# Patient Record
Sex: MALE | Race: AMERICAN INDIAN OR ALASKA NATIVE | URBAN - METROPOLITAN AREA
[De-identification: names, ages, dates, MRNs, and addresses within clinical notes are randomized per-mention and may not be internally consistent; named-entity substitution may affect disease eponyms.]

---

## 2020-01-27 ENCOUNTER — INPATIENT (INPATIENT)
Facility: HOSPITAL | Age: 75
LOS: 1 days | Discharge: HOME | End: 2020-01-29
Attending: INTERNAL MEDICINE | Admitting: INTERNAL MEDICINE
Payer: MEDICARE

## 2020-01-27 VITALS
SYSTOLIC BLOOD PRESSURE: 150 MMHG | OXYGEN SATURATION: 99 % | WEIGHT: 134.92 LBS | HEIGHT: 62 IN | HEART RATE: 77 BPM | TEMPERATURE: 98 F | DIASTOLIC BLOOD PRESSURE: 67 MMHG | RESPIRATION RATE: 16 BRPM

## 2020-01-27 LAB
ALBUMIN SERPL ELPH-MCNC: 4.2 G/DL — SIGNIFICANT CHANGE UP (ref 3.5–5.2)
ALP SERPL-CCNC: 65 U/L — SIGNIFICANT CHANGE UP (ref 30–115)
ALT FLD-CCNC: 19 U/L — SIGNIFICANT CHANGE UP (ref 0–41)
ANION GAP SERPL CALC-SCNC: 14 MMOL/L — SIGNIFICANT CHANGE UP (ref 7–14)
AST SERPL-CCNC: 24 U/L — SIGNIFICANT CHANGE UP (ref 0–41)
BASOPHILS # BLD AUTO: 0.06 K/UL — SIGNIFICANT CHANGE UP (ref 0–0.2)
BASOPHILS NFR BLD AUTO: 1.1 % — HIGH (ref 0–1)
BILIRUB SERPL-MCNC: 0.3 MG/DL — SIGNIFICANT CHANGE UP (ref 0.2–1.2)
BUN SERPL-MCNC: 17 MG/DL — SIGNIFICANT CHANGE UP (ref 10–20)
CALCIUM SERPL-MCNC: 10.1 MG/DL — SIGNIFICANT CHANGE UP (ref 8.5–10.1)
CHLORIDE SERPL-SCNC: 100 MMOL/L — SIGNIFICANT CHANGE UP (ref 98–110)
CO2 SERPL-SCNC: 24 MMOL/L — SIGNIFICANT CHANGE UP (ref 17–32)
CREAT SERPL-MCNC: 0.7 MG/DL — SIGNIFICANT CHANGE UP (ref 0.7–1.5)
EOSINOPHIL # BLD AUTO: 0.09 K/UL — SIGNIFICANT CHANGE UP (ref 0–0.7)
EOSINOPHIL NFR BLD AUTO: 1.6 % — SIGNIFICANT CHANGE UP (ref 0–8)
GLUCOSE SERPL-MCNC: 123 MG/DL — HIGH (ref 70–99)
HCT VFR BLD CALC: 44.9 % — SIGNIFICANT CHANGE UP (ref 42–52)
HGB BLD-MCNC: 15.1 G/DL — SIGNIFICANT CHANGE UP (ref 14–18)
IMM GRANULOCYTES NFR BLD AUTO: 0.4 % — HIGH (ref 0.1–0.3)
LYMPHOCYTES # BLD AUTO: 1.59 K/UL — SIGNIFICANT CHANGE UP (ref 1.2–3.4)
LYMPHOCYTES # BLD AUTO: 29.1 % — SIGNIFICANT CHANGE UP (ref 20.5–51.1)
MCHC RBC-ENTMCNC: 29.6 PG — SIGNIFICANT CHANGE UP (ref 27–31)
MCHC RBC-ENTMCNC: 33.6 G/DL — SIGNIFICANT CHANGE UP (ref 32–37)
MCV RBC AUTO: 88 FL — SIGNIFICANT CHANGE UP (ref 80–94)
MONOCYTES # BLD AUTO: 0.46 K/UL — SIGNIFICANT CHANGE UP (ref 0.1–0.6)
MONOCYTES NFR BLD AUTO: 8.4 % — SIGNIFICANT CHANGE UP (ref 1.7–9.3)
NEUTROPHILS # BLD AUTO: 3.25 K/UL — SIGNIFICANT CHANGE UP (ref 1.4–6.5)
NEUTROPHILS NFR BLD AUTO: 59.4 % — SIGNIFICANT CHANGE UP (ref 42.2–75.2)
NRBC # BLD: 0 /100 WBCS — SIGNIFICANT CHANGE UP (ref 0–0)
PLATELET # BLD AUTO: 260 K/UL — SIGNIFICANT CHANGE UP (ref 130–400)
POTASSIUM SERPL-MCNC: 4.6 MMOL/L — SIGNIFICANT CHANGE UP (ref 3.5–5)
POTASSIUM SERPL-SCNC: 4.6 MMOL/L — SIGNIFICANT CHANGE UP (ref 3.5–5)
PROT SERPL-MCNC: 7.3 G/DL — SIGNIFICANT CHANGE UP (ref 6–8)
RBC # BLD: 5.1 M/UL — SIGNIFICANT CHANGE UP (ref 4.7–6.1)
RBC # FLD: 12.5 % — SIGNIFICANT CHANGE UP (ref 11.5–14.5)
SODIUM SERPL-SCNC: 138 MMOL/L — SIGNIFICANT CHANGE UP (ref 135–146)
WBC # BLD: 5.47 K/UL — SIGNIFICANT CHANGE UP (ref 4.8–10.8)
WBC # FLD AUTO: 5.47 K/UL — SIGNIFICANT CHANGE UP (ref 4.8–10.8)

## 2020-01-27 PROCEDURE — 99291 CRITICAL CARE FIRST HOUR: CPT

## 2020-01-27 PROCEDURE — 70496 CT ANGIOGRAPHY HEAD: CPT | Mod: 26

## 2020-01-27 PROCEDURE — 70498 CT ANGIOGRAPHY NECK: CPT | Mod: 26

## 2020-01-27 PROCEDURE — 70450 CT HEAD/BRAIN W/O DYE: CPT | Mod: 26,59

## 2020-01-27 PROCEDURE — 93010 ELECTROCARDIOGRAM REPORT: CPT

## 2020-01-27 RX ORDER — ATORVASTATIN CALCIUM 80 MG/1
80 TABLET, FILM COATED ORAL ONCE
Refills: 0 | Status: COMPLETED | OUTPATIENT
Start: 2020-01-27 | End: 2020-01-27

## 2020-01-27 RX ORDER — ASPIRIN/CALCIUM CARB/MAGNESIUM 324 MG
325 TABLET ORAL ONCE
Refills: 0 | Status: COMPLETED | OUTPATIENT
Start: 2020-01-27 | End: 2020-01-27

## 2020-01-27 RX ADMIN — Medication 325 MILLIGRAM(S): at 20:35

## 2020-01-27 RX ADMIN — ATORVASTATIN CALCIUM 80 MILLIGRAM(S): 80 TABLET, FILM COATED ORAL at 20:35

## 2020-01-27 NOTE — H&P ADULT - NSHPPHYSICALEXAM_GEN_ALL_CORE
GEN: No acute distress  HEENT: AT, NC, PERRLA  LUNGS: Clear to auscultation bilaterally   HEART: S1/S2 present. RRR.   ABD: Soft, non-tender, non-distended. Bowel sounds present  EXT: edema , cyanosis, clubbing absent  NEURO: AAOX3, CN I- XII grossly normal, Power decreased on left side in upper and lower Ext 4/5 as compared to Rt side 5/5.   Cerebellar functions intact. Gait normal.

## 2020-01-27 NOTE — ED PROVIDER NOTE - OBJECTIVE STATEMENT
74y m h/o htn, hl on baby asa daily, bph, tia in past p/w 3 episodes of intermitt L facial numbness x 3 weeks. Accomp by tinnitus, LUE numbness & L diplopia which he states is a chronic issue for him, s/p laser surgery appx 1y ago. Currently asymptomatic. Denies ha, neck pain, cp/sob, nvd, abd pain, flank pain, focal numbness or weakness. Similar sx in past, had MRI brain/MRA head+neck in 2016 showing chronic microvascular changes and poss L ICA aneurysm (rec further characterization w/CTA). PMD Chint[facility] / Neuro Dr. Shore.

## 2020-01-27 NOTE — ED ADULT TRIAGE NOTE - CHIEF COMPLAINT QUOTE
Pt states " left cheek numbness" left leg numbness , Pt states symptoms started few  weeks a go started aspirin then it stopped, now started again 2-3days ago, took aspirin with relief, but since last night now has no relief.  Pt states left eye discomfort.

## 2020-01-27 NOTE — ED ADULT NURSE REASSESSMENT NOTE - NS ED NURSE REASSESS COMMENT FT1
patient received from previous RN.  Patient in stable condition and nad. Patient A&Ox4 and has son at bedside for translation. Patient has IV access previously placed and patent, flushes well with no difficulty, right AC 20G. Patient vs stable. denies any numbness to the left cheek or left arm. Patient ambulates with a steady gait.  Patient offers no complaints. Will continue to monitor.

## 2020-01-27 NOTE — H&P ADULT - HISTORY OF PRESENT ILLNESS
74 years old male (right handed) with past medical h/o HTN, DLD and BPH presented to the ED for the complain of numbness over the left side of face that radiated to ear, Left arm and left leg. This numbness started for the first time 3 weeks ago and lasted for about an hour and went away on its own. Patient started taking aspirin after that on advice of family doctor and says that he was at his baseline since then. Yesterday he again experienced identical numbness of left side of body that again lasted for an hour. Patient denies any tingling, weakness or trouble using left hand for holding objects.  Patient reports that he was taking aspirin previously for 10-13 years. he visited his neurologist ( Dr. Frederic segura) for memory problems about 3 years ago. he did some imaging that showed "aneurysm" and told patient to follow up every year and take aspirin. but patient had stop taking aspirin since last 3 years because he had an ulcer/ bleed ? for which he had to undergo endoscopy at that time. According to patient everything was fine on EGD.    In ED CT head was performed that was consistent with chronic microvascular changes. CTA head & Neck was unremarkable for any vascular malformation. Vitals were normal except / 70mmHg which according to patient is higher than his baseline ( 120/80mmHg).    Patient denies any headache, dizziness, vision problems, chest pain, palpitations, SOB, cough, fever, chills, constipation, diarrhea, abdominal pain, , dysuria.

## 2020-01-27 NOTE — H&P ADULT - NSHPPOADEEPVENOUSTHROMB_GEN_A_CORE
no
Pt lives with spouse who works till 330 son is home. 7 KENYON + 2 rails and 6+6 +railing. Pt needs to do 6 steps to bedroom and bathroom. +tub. Pt reports spouse will sponge bath pt at home.

## 2020-01-27 NOTE — H&P ADULT - ASSESSMENT
74 years old male (right handed) with past medical h/o HTN, DLD and BPH presented to the ED for the complain of numbness over the left side of face that radiated to ear, Left arm and left leg.    # Transient Ischemic Attack    - CT head and CT angio rule out any active stroke  - Will order MRI head w/o contrast  - ASA 81 mg , Statin 80 mg  - TTE with bubble study  - F/U HBA1C and lipid profile in AM  - may need tele monitoring to r/o Arryhmias as cause of cryptogenic stroke    # Hypertension  - will c/w metoprolol tartarate 25 Bid    # DLD  - will increase statin to 80 mg    # BPH  - flomax can be added if patient complains of symptoms    Diet : DASH  DVT PPX : Lovenox  GI PPX : not needed  Activity : As tolerated  Dispo : acute 74 years old male (right handed) with past medical h/o HTN, DLD and BPH presented to the ED for the complain of numbness over the left side of face that radiated to ear, Left arm and left leg.    # Transient Ischemic Attack    - CT head and CT angio rule out any active stroke  - Will order MRI head w/o contrast  - ASA 81 mg , Statin 80 mg  - TTE with bubble study  - F/U HBA1C and lipid profile in AM  - F/u neurology recommendations , may need tele monitoring to r/o Arrythmias as cause of cryptogenic stroke    # Hypertension  - will c/w metoprolol tartarate 25 Bid    # DLD  - will increase statin to 80 mg    # BPH  - flomax can be added if patient complains of symptoms    Diet : DASH  DVT PPX : Lovenox  GI PPX : not needed  Activity : As tolerated  Dispo : acute 74 years old male (right handed) with past medical h/o HTN, DLD and BPH presented to the ED for the complain of numbness over the left side of face that radiated to ear, Left arm and left leg.    # Transient Ischemic Attack    - CT head and CT angio rule out any active stroke  - Will order MRI head w/o contrast  - ASA 81 mg , Statin 80 mg  - TTE with bubble study  - F/U HBA1C and lipid profile in AM  - F/u neurology recommendations , may need tele monitoring to r/o Arrythmias as cause of cryptogenic stroke    # Hypertension  - will c/w metoprolol tartarate 25 Bid, lisinopril 5mg OD    # DLD  - will increase statin to 80 mg    # BPH  - flomax can be added if patient complains of symptoms    Diet : DASH  DVT PPX : Lovenox  GI PPX : not needed  Activity : As tolerated  Dispo : acute 74 years old male (right handed) with past medical h/o HTN, DLD and BPH presented to the ED for the complain of numbness over the left side of face that radiated to ear, Left arm and left leg.    # Transient Ischemic Attack    - CT head and CT angio rule out any active stroke  - Will order MRI head w/o contrast  - Power decreased on Left side as compared to Right side  - ASA 81 mg , Statin 80 mg  - TTE with bubble study  - F/U HBA1C and lipid profile in AM  - F/u neurology recommendations , may need tele monitoring to r/o Arrythmias as cause of cryptogenic stroke    # Hypertension  - will c/w metoprolol tartarate 25 Bid, lisinopril 5mg OD    # DLD  - will increase statin to 80 mg    # BPH  - flomax can be added if patient complains of symptoms    Diet : DASH  DVT PPX : Lovenox  GI PPX : not needed  Activity : As tolerated  Dispo : acute

## 2020-01-27 NOTE — ED PROVIDER NOTE - PROGRESS NOTE DETAILS
Authored by Dr. Mary Connors: d/w Dr. Mitchell, MRI/MRA from 2016 reviewed, recs asa, statin, CTA head given h/o poss ICA aneurysm on previous MRA head, as well as stroke unit admission for MRI brain

## 2020-01-27 NOTE — ED PROVIDER NOTE - CLINICAL SUMMARY MEDICAL DECISION MAKING FREE TEXT BOX
TIA sx, neuro exam nf, CTH chronic microvascular changes, prior MRI/MRA imaging showin poss L ICA aneurysm - d/w Neuro Dr. Mitchell, rec cta head/neck, asa/statin, stroke unit for inpatient MRI imaging

## 2020-01-27 NOTE — ED PROVIDER NOTE - CRITICAL CARE PROVIDED
consult w/ pt's family directly relating to pts condition/interpretation of diagnostic studies/additional history taking/consultation with other physicians/direct patient care (not related to procedure)/documentation

## 2020-01-27 NOTE — H&P ADULT - NSHPLABSRESULTS_GEN_ALL_CORE
< from: CT Angio Head w/ IV Cont (01.27.20 @ 22:04) >    Impression:  Essentially unremarkable CT angiography of the neck and brain, with no major vessel occlusion, flow-limiting stenosis, or aneurysm identified at this time.    1.4 cm circumscribed enhancing lesion of the right parotid gland; findings are nonspecific and nonemergent outpatient percutaneous biopsy may be considered.    < end of copied text >    < from: CT Head No Cont (01.27.20 @ 18:33) >    IMPRESSION:     1.  No acute intracranial pathology.   2.  Chronic findings as detailed above.      < end of copied text >

## 2020-01-27 NOTE — ED PROVIDER NOTE - PHYSICAL EXAMINATION
VITAL SIGNS: I have reviewed nursing notes and confirm.  CONSTITUTIONAL: Well-developed; well-nourished; in no acute distress.  SKIN: Skin exam is warm and dry, no acute rash.  HEAD: Normocephalic; atraumatic.  EYES: PERRL, EOM intact; conjunctiva and sclera clear.  ENT: eac/tms clear. no nasal discharge; airway clear.  NECK: Supple; non tender.  CARD: S1, S2 normal; no murmurs, gallops, or rubs. Regular rate and rhythm.  RESP: No wheezes, rales or rhonchi.  ABD: Normal bowel sounds; soft; non-distended; non-tender; no hepatosplenomegaly; no rgr.  BACK: non-tender, no CVAT  EXT: Normal ROM. No clubbing, cyanosis or edema. DPI.  NEURO: aaox3, cn 2-12 intact bl no nystagmus or facial droop, 5/5 strength x 4 no drift, gross sensation intact, finger-nose nl, gait nl, romberg neg  PSYCH: Cooperative, appropriate.

## 2020-01-27 NOTE — ED PROVIDER NOTE - CARE PLAN
Principal Discharge DX:	Facial numbness  Secondary Diagnosis:	Tinnitus of left ear  Secondary Diagnosis:	Diplopia

## 2020-01-27 NOTE — H&P ADULT - ATTENDING COMMENTS
75 YO M with a PMH of HTN, DLD and BPH presented to the hospital with a c/o left-sided numbness of face, LUE, and LLE for the past x 2 weeks. Associated with decreased motor strength on the left. In the ED, pt with CTH that was neg. CTA-head/neck neg. Started on ASA/statin. Physical exam shows pt in NAD. no facial asymmetry. Motor strength decreased on the LUE/LLE. CNs intact. Labs and radiology as above. Left-sided weakness likely due to CVA vs hypertensive urgency; less likely TIA as symptoms are still present. Neruo consult. Secondary stroke PPX. Neurochecks. A1c, Lipid panel, and B12. MRI in the AM. Hx of HTN, DLD, and BPH. Restart home meds. GI and DVT PPX. Fall precautions. Rest as per above note.

## 2020-01-27 NOTE — ED PROVIDER NOTE - NS ED ROS FT
Constitutional: No fever, chills, unintended weight loss.  Eyes:  No visual changes, eye pain or discharge.  ENMT:  No hearing changes, pain, no sore throat or runny nose, no difficulty swallowing  Cardiac:  No chest pain, SOB or edema. No chest pain with exertion.  Respiratory:  No cough or respiratory distress. No hemoptysis. No history of asthma or RAD.  GI:  No nausea, vomiting, diarrhea or abdominal pain.  :  No dysuria, frequency or burning.  MS:  No myalgia, muscle weakness, joint pain or back pain.  Neuro:  see hpi  Skin:  No skin rash.   Endocrine: No history of thyroid disease or diabetes.

## 2020-01-27 NOTE — H&P ADULT - NSHPSOCIALHISTORY_GEN_ALL_CORE
Lives independently with wife  Non- Smoker , Smoked during teens only  Drinks alcohol occasionally only

## 2020-01-28 LAB
ALBUMIN SERPL ELPH-MCNC: 3.8 G/DL — SIGNIFICANT CHANGE UP (ref 3.5–5.2)
ALP SERPL-CCNC: 62 U/L — SIGNIFICANT CHANGE UP (ref 30–115)
ALT FLD-CCNC: 16 U/L — SIGNIFICANT CHANGE UP (ref 0–41)
ANION GAP SERPL CALC-SCNC: 12 MMOL/L — SIGNIFICANT CHANGE UP (ref 7–14)
AST SERPL-CCNC: 18 U/L — SIGNIFICANT CHANGE UP (ref 0–41)
BILIRUB SERPL-MCNC: 0.4 MG/DL — SIGNIFICANT CHANGE UP (ref 0.2–1.2)
BUN SERPL-MCNC: 17 MG/DL — SIGNIFICANT CHANGE UP (ref 10–20)
CALCIUM SERPL-MCNC: 9.9 MG/DL — SIGNIFICANT CHANGE UP (ref 8.5–10.1)
CHLORIDE SERPL-SCNC: 105 MMOL/L — SIGNIFICANT CHANGE UP (ref 98–110)
CHOLEST SERPL-MCNC: 203 MG/DL — HIGH (ref 100–200)
CO2 SERPL-SCNC: 26 MMOL/L — SIGNIFICANT CHANGE UP (ref 17–32)
CREAT SERPL-MCNC: 0.7 MG/DL — SIGNIFICANT CHANGE UP (ref 0.7–1.5)
ESTIMATED AVERAGE GLUCOSE: 154 MG/DL — HIGH (ref 68–114)
GLUCOSE BLDC GLUCOMTR-MCNC: 171 MG/DL — HIGH (ref 70–99)
GLUCOSE SERPL-MCNC: 100 MG/DL — HIGH (ref 70–99)
HBA1C BLD-MCNC: 7 % — HIGH (ref 4–5.6)
HCT VFR BLD CALC: 42.1 % — SIGNIFICANT CHANGE UP (ref 42–52)
HCV AB S/CO SERPL IA: 3.26 S/CO — HIGH (ref 0–0.99)
HCV AB SERPL-IMP: ABNORMAL
HDLC SERPL-MCNC: 35 MG/DL — LOW
HGB BLD-MCNC: 14 G/DL — SIGNIFICANT CHANGE UP (ref 14–18)
LIPID PNL WITH DIRECT LDL SERPL: 142 MG/DL — HIGH (ref 4–129)
MAGNESIUM SERPL-MCNC: 1.9 MG/DL — SIGNIFICANT CHANGE UP (ref 1.8–2.4)
MCHC RBC-ENTMCNC: 28.7 PG — SIGNIFICANT CHANGE UP (ref 27–31)
MCHC RBC-ENTMCNC: 33.3 G/DL — SIGNIFICANT CHANGE UP (ref 32–37)
MCV RBC AUTO: 86.4 FL — SIGNIFICANT CHANGE UP (ref 80–94)
NRBC # BLD: 0 /100 WBCS — SIGNIFICANT CHANGE UP (ref 0–0)
PLATELET # BLD AUTO: 232 K/UL — SIGNIFICANT CHANGE UP (ref 130–400)
POTASSIUM SERPL-MCNC: 4.3 MMOL/L — SIGNIFICANT CHANGE UP (ref 3.5–5)
POTASSIUM SERPL-SCNC: 4.3 MMOL/L — SIGNIFICANT CHANGE UP (ref 3.5–5)
PROT SERPL-MCNC: 6.6 G/DL — SIGNIFICANT CHANGE UP (ref 6–8)
RBC # BLD: 4.87 M/UL — SIGNIFICANT CHANGE UP (ref 4.7–6.1)
RBC # FLD: 12.5 % — SIGNIFICANT CHANGE UP (ref 11.5–14.5)
SODIUM SERPL-SCNC: 143 MMOL/L — SIGNIFICANT CHANGE UP (ref 135–146)
TOTAL CHOLESTEROL/HDL RATIO MEASUREMENT: 5.8 RATIO — HIGH (ref 4–5.5)
TRIGL SERPL-MCNC: 193 MG/DL — HIGH (ref 10–149)
WBC # BLD: 6.09 K/UL — SIGNIFICANT CHANGE UP (ref 4.8–10.8)
WBC # FLD AUTO: 6.09 K/UL — SIGNIFICANT CHANGE UP (ref 4.8–10.8)

## 2020-01-28 PROCEDURE — 99223 1ST HOSP IP/OBS HIGH 75: CPT | Mod: AI

## 2020-01-28 PROCEDURE — 71250 CT THORAX DX C-: CPT | Mod: 26

## 2020-01-28 PROCEDURE — 93306 TTE W/DOPPLER COMPLETE: CPT | Mod: 26

## 2020-01-28 PROCEDURE — 70551 MRI BRAIN STEM W/O DYE: CPT | Mod: 26

## 2020-01-28 PROCEDURE — 99221 1ST HOSP IP/OBS SF/LOW 40: CPT

## 2020-01-28 RX ORDER — ATORVASTATIN CALCIUM 80 MG/1
1 TABLET, FILM COATED ORAL
Qty: 0 | Refills: 0 | DISCHARGE

## 2020-01-28 RX ORDER — ENOXAPARIN SODIUM 100 MG/ML
40 INJECTION SUBCUTANEOUS AT BEDTIME
Refills: 0 | Status: DISCONTINUED | OUTPATIENT
Start: 2020-01-28 | End: 2020-01-29

## 2020-01-28 RX ORDER — LISINOPRIL 2.5 MG/1
5 TABLET ORAL DAILY
Refills: 0 | Status: DISCONTINUED | OUTPATIENT
Start: 2020-01-28 | End: 2020-01-29

## 2020-01-28 RX ORDER — ASPIRIN/CALCIUM CARB/MAGNESIUM 324 MG
81 TABLET ORAL DAILY
Refills: 0 | Status: DISCONTINUED | OUTPATIENT
Start: 2020-01-28 | End: 2020-01-29

## 2020-01-28 RX ORDER — ASPIRIN/CALCIUM CARB/MAGNESIUM 324 MG
1 TABLET ORAL
Qty: 0 | Refills: 0 | DISCHARGE

## 2020-01-28 RX ORDER — GEMFIBROZIL 600 MG
1 TABLET ORAL
Qty: 0 | Refills: 0 | DISCHARGE

## 2020-01-28 RX ORDER — LISINOPRIL 2.5 MG/1
1 TABLET ORAL
Qty: 0 | Refills: 0 | DISCHARGE

## 2020-01-28 RX ORDER — CHLORHEXIDINE GLUCONATE 213 G/1000ML
1 SOLUTION TOPICAL
Refills: 0 | Status: DISCONTINUED | OUTPATIENT
Start: 2020-01-28 | End: 2020-01-29

## 2020-01-28 RX ORDER — METOPROLOL TARTRATE 50 MG
1 TABLET ORAL
Qty: 0 | Refills: 0 | DISCHARGE

## 2020-01-28 RX ORDER — METOPROLOL TARTRATE 50 MG
25 TABLET ORAL
Refills: 0 | Status: DISCONTINUED | OUTPATIENT
Start: 2020-01-28 | End: 2020-01-29

## 2020-01-28 RX ORDER — ATORVASTATIN CALCIUM 80 MG/1
80 TABLET, FILM COATED ORAL AT BEDTIME
Refills: 0 | Status: DISCONTINUED | OUTPATIENT
Start: 2020-01-28 | End: 2020-01-29

## 2020-01-28 RX ADMIN — Medication 25 MILLIGRAM(S): at 05:35

## 2020-01-28 RX ADMIN — Medication 25 MILLIGRAM(S): at 17:56

## 2020-01-28 RX ADMIN — ENOXAPARIN SODIUM 40 MILLIGRAM(S): 100 INJECTION SUBCUTANEOUS at 21:47

## 2020-01-28 RX ADMIN — LISINOPRIL 5 MILLIGRAM(S): 2.5 TABLET ORAL at 05:35

## 2020-01-28 RX ADMIN — ATORVASTATIN CALCIUM 80 MILLIGRAM(S): 80 TABLET, FILM COATED ORAL at 21:49

## 2020-01-28 RX ADMIN — Medication 81 MILLIGRAM(S): at 11:43

## 2020-01-28 NOTE — CONSULT NOTE ADULT - ASSESSMENT
Impression:  74 years old male (right handed) with past medical h/o HTN, DLD and BPH presented to the ED for the complain of numbness over the left side of face that radiated to ear, Left arm and left leg.    In ED CT head was performed that was consistent with chronic microvascular changes. CTA head & Neck was unremarkable. Left upper extremity remains subjectively weak. Etiology of symptoms unknown but will have a better understanding post stroke workup.     Suggestion:  Routine stroke workup including:  MRI brain without vasquez.  TTE.  LDL, A1C  Telemetry monitoring.   PT OT Rehab    Continue ASA and statin.    Disposition:  Stroke unit.     José Manuel Lara NP  [phone]

## 2020-01-28 NOTE — CONSULT NOTE ADULT - SUBJECTIVE AND OBJECTIVE BOX
KESHA LOWE    Chief Complaint: LHS    HPI:  74 years old male (right handed) with past medical h/o HTN, DLD and BPH presented to the ED for the complain of numbness over the left side of face that radiated to ear, Left arm and left leg. This numbness started for the first time 3 weeks ago and lasted for about an hour and went away on its own. Patient started taking aspirin after that on advice of family doctor and says that he was at his baseline since then. Yesterday he again experienced identical numbness of left side of body that again lasted for an hour. Patient denies any tingling, weakness or trouble using left hand for holding objects.  Patient reports that he was taking aspirin previously for 10-13 years. he visited his neurologist ( Dr. Frederic segura) for memory problems about 3 years ago. he did some imaging that showed "aneurysm" and told patient to follow up every year and take aspirin. but patient had stop taking aspirin since last 3 years because he had an ulcer/ bleed ? for which he had to undergo endoscopy at that time. According to patient everything was fine on EGD.    In ED CT head was performed that was consistent with chronic microvascular changes. CTA head & Neck was unremarkable for any vascular malformation. Vitals were normal except / 70mmHg which according to patient is higher than his baseline ( 120/80mmHg).    Patient denies any headache, dizziness, vision problems, chest pain, palpitations, SOB, cough, fever, chills, constipation, diarrhea, abdominal pain, , dysuria. (27 Jan 2020 23:52)      Relevant PMH:  [] Prior ischemic stroke/TIA  [] Afib  []CAD  [x]HTN  [x]DLD  []DM []PVD []Obesity [] Sedintary lifestyle []CHF  []DEACON  []Cancer Hx     Social History: [] Smoking []  Drug Use: []   Alcohol Use:   [] Other:      Possible Location of Stroke:  Unknown at this time, will have a better understanding post stroke workup.  Possible Cause of Stroke:  Unknown at this time, will have a better understanding post stroke workup.  Relevant Cerebral Imaging:  < from: CT Head No Cont (01.27.20 @ 18:33) >  IMPRESSION:     1.  No acute intracranial pathology.     < end of copied text >    Relevant Cervicocerebral Imaging:  CT Angio Neck w/ IV Cont:   EXAM:  CT ANGIO NECK (W)AW IC        EXAM:  CT ANGIO BRAIN (W)AW IC          Impression:    1.  No large vessel occlusion.    2.  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    3.  Lesion arising in the superficial portion of the right parotid gland (benign versus malignant).    4.  Opacity in the right upper lobe, incompletely evaluated, correlation with a CT scan of the chest is suggested.  Impression:    1.  No large vessel occlusion.    2.  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    3.  Lesion arising in the superficial portion of the right parotid gland (benign versus malignant).    4.  Opacity in the right upper lobe, incompletely evaluated, correlation with a CT scan of the chest is suggested.    Relevant blood tests:  Direct LDL: 142 mg/dL <H> [4 - 129] (01-28-20 @ 06:16)      Relevant cardiac rhythm monitoring:  No reported events  Relevant Cardiac Structure:(TTE/JOSEMANUEL +/-):[]No intracardiac thrombus/[] no vegetation/[]no [place]/EF:  pending    Home Medications:  aspirin 81 mg oral tablet, chewable: 1 tab(s) orally once a day (28 Jan 2020 00:54)  atorvastatin 20 mg oral tablet: 1 tab(s) orally once a day (28 Jan 2020 00:54)  gemfibrozil 600 mg oral tablet: 1 tab(s) orally once a day (28 Jan 2020 00:54)  lisinopril 5 mg oral tablet: 1 tab(s) orally once a day (28 Jan 2020 00:54)  Metoprolol Tartrate 25 mg oral tablet: 1 tab(s) orally 2 times a day (28 Jan 2020 00:54)      MEDICATIONS  (STANDING):  aspirin  chewable 81 milliGRAM(s) Oral daily  atorvastatin 80 milliGRAM(s) Oral at bedtime  chlorhexidine 4% Liquid 1 Application(s) Topical two times a day  enoxaparin Injectable 40 milliGRAM(s) SubCutaneous at bedtime  lisinopril 5 milliGRAM(s) Oral daily  metoprolol tartrate 25 milliGRAM(s) Oral two times a day      PT/OT/Speech/Rehab/S&Swr: pending    Exam:    Vital Signs Last 24 Hrs  T(C): 36.8 (28 Jan 2020 07:40), Max: 36.8 (28 Jan 2020 07:40)  T(F): 98.2 (28 Jan 2020 07:40), Max: 98.2 (28 Jan 2020 07:40)  HR: 65 (28 Jan 2020 07:40) (65 - 80)  BP: 121/56 (28 Jan 2020 07:40) (115/65 - 150/67)  BP(mean): --  RR: 18 (28 Jan 2020 07:40) (16 - 18)  SpO2: 97% (28 Jan 2020 07:40) (97% - 99%)    NIHSS      LOC:       1a:  0   1b(Questions):    0       1c(Instructions):  0           Best Gaze:0  Visual:0  Motor:                 RUE:0     RLE:   0  LUE:1     LLE:   0  FACE: 0    Limb Ataxia:0  Sensory:     0  Language:     0  Dysarthria:        0  Extinction and Inattention:0      NIHSS today:    1         m-RS:1

## 2020-01-28 NOTE — PROGRESS NOTE ADULT - SUBJECTIVE AND OBJECTIVE BOX
SUBJECTIVE:    Patient is a 74y old Male who presents with a chief complaint of Numbness of left side of face, arm , leg (28 Jan 2020 10:57)    Denies any current facial numbness or weakness    PAST MEDICAL & SURGICAL HISTORY  Dyslipidemia  BPH (benign prostatic hyperplasia)  Hypertension  No significant past surgical history       ALLERGIES:  No Known Allergies    MEDICATIONS:  STANDING MEDICATIONS  aspirin  chewable 81 milliGRAM(s) Oral daily  atorvastatin 80 milliGRAM(s) Oral at bedtime  chlorhexidine 4% Liquid 1 Application(s) Topical two times a day  enoxaparin Injectable 40 milliGRAM(s) SubCutaneous at bedtime  lisinopril 5 milliGRAM(s) Oral daily  metoprolol tartrate 25 milliGRAM(s) Oral two times a day    PRN MEDICATIONS    VITALS:   T(F): 98.2  HR: 65  BP: 121/56  RR: 18  SpO2: 97%    LABS:                        14.0   6.09  )-----------( 232      ( 28 Jan 2020 06:16 )             42.1     01-28    143  |  105  |  17  ----------------------------<  100<H>  4.3   |  26  |  0.7    Ca    9.9      28 Jan 2020 06:16  Mg     1.9     01-28    TPro  6.6  /  Alb  3.8  /  TBili  0.4  /  DBili  x   /  AST  18  /  ALT  16  /  AlkPhos  62  01-28                          PHYSICAL EXAM:  GEN: NAD, comfortable  LUNGS: CTAB, no w/r/r  HEART: RRR, no m/r/g  ABD: soft, NT/ND, +BS  EXT: no edema, PP b/l  NEURO: AAOx3, face symmetrical, 5/5 strength of both upper and lower extremities

## 2020-01-28 NOTE — ED ADULT NURSE REASSESSMENT NOTE - NS ED NURSE REASSESS COMMENT FT1
patient moved to Main ED 12B. Patient remains on cardiac monitor. Patient in stable condition and nad. Patient son at bedside for translation. REport given to JAZLYN Alba.  Patient A&Ox4, ambulates with steady gait. Patient denies any numbness to face, arms or legs.

## 2020-01-29 ENCOUNTER — TRANSCRIPTION ENCOUNTER (OUTPATIENT)
Age: 75
End: 2020-01-29

## 2020-01-29 VITALS
OXYGEN SATURATION: 96 % | RESPIRATION RATE: 18 BRPM | HEART RATE: 60 BPM | DIASTOLIC BLOOD PRESSURE: 56 MMHG | TEMPERATURE: 98 F | SYSTOLIC BLOOD PRESSURE: 112 MMHG

## 2020-01-29 LAB
ANION GAP SERPL CALC-SCNC: 12 MMOL/L — SIGNIFICANT CHANGE UP (ref 7–14)
BASOPHILS # BLD AUTO: 0.04 K/UL — SIGNIFICANT CHANGE UP (ref 0–0.2)
BASOPHILS NFR BLD AUTO: 0.6 % — SIGNIFICANT CHANGE UP (ref 0–1)
BUN SERPL-MCNC: 17 MG/DL — SIGNIFICANT CHANGE UP (ref 10–20)
CALCIUM SERPL-MCNC: 10.4 MG/DL — HIGH (ref 8.5–10.1)
CHLORIDE SERPL-SCNC: 103 MMOL/L — SIGNIFICANT CHANGE UP (ref 98–110)
CO2 SERPL-SCNC: 27 MMOL/L — SIGNIFICANT CHANGE UP (ref 17–32)
CREAT SERPL-MCNC: 0.8 MG/DL — SIGNIFICANT CHANGE UP (ref 0.7–1.5)
EOSINOPHIL # BLD AUTO: 0.24 K/UL — SIGNIFICANT CHANGE UP (ref 0–0.7)
EOSINOPHIL NFR BLD AUTO: 3.7 % — SIGNIFICANT CHANGE UP (ref 0–8)
FOLATE SERPL-MCNC: >20 NG/ML — SIGNIFICANT CHANGE UP
GLUCOSE SERPL-MCNC: 108 MG/DL — HIGH (ref 70–99)
HCT VFR BLD CALC: 45.3 % — SIGNIFICANT CHANGE UP (ref 42–52)
HCV RNA FLD QL NAA+PROBE: SIGNIFICANT CHANGE UP
HCV RNA SPEC QL PROBE+SIG AMP: SIGNIFICANT CHANGE UP
HGB BLD-MCNC: 15.4 G/DL — SIGNIFICANT CHANGE UP (ref 14–18)
IMM GRANULOCYTES NFR BLD AUTO: 0.2 % — SIGNIFICANT CHANGE UP (ref 0.1–0.3)
LYMPHOCYTES # BLD AUTO: 2.21 K/UL — SIGNIFICANT CHANGE UP (ref 1.2–3.4)
LYMPHOCYTES # BLD AUTO: 34.2 % — SIGNIFICANT CHANGE UP (ref 20.5–51.1)
MAGNESIUM SERPL-MCNC: 1.9 MG/DL — SIGNIFICANT CHANGE UP (ref 1.8–2.4)
MCHC RBC-ENTMCNC: 29.5 PG — SIGNIFICANT CHANGE UP (ref 27–31)
MCHC RBC-ENTMCNC: 34 G/DL — SIGNIFICANT CHANGE UP (ref 32–37)
MCV RBC AUTO: 86.8 FL — SIGNIFICANT CHANGE UP (ref 80–94)
MONOCYTES # BLD AUTO: 0.7 K/UL — HIGH (ref 0.1–0.6)
MONOCYTES NFR BLD AUTO: 10.8 % — HIGH (ref 1.7–9.3)
NEUTROPHILS # BLD AUTO: 3.27 K/UL — SIGNIFICANT CHANGE UP (ref 1.4–6.5)
NEUTROPHILS NFR BLD AUTO: 50.5 % — SIGNIFICANT CHANGE UP (ref 42.2–75.2)
NRBC # BLD: 0 /100 WBCS — SIGNIFICANT CHANGE UP (ref 0–0)
PLATELET # BLD AUTO: 247 K/UL — SIGNIFICANT CHANGE UP (ref 130–400)
POTASSIUM SERPL-MCNC: 4.3 MMOL/L — SIGNIFICANT CHANGE UP (ref 3.5–5)
POTASSIUM SERPL-SCNC: 4.3 MMOL/L — SIGNIFICANT CHANGE UP (ref 3.5–5)
RBC # BLD: 5.22 M/UL — SIGNIFICANT CHANGE UP (ref 4.7–6.1)
RBC # FLD: 12.6 % — SIGNIFICANT CHANGE UP (ref 11.5–14.5)
SODIUM SERPL-SCNC: 142 MMOL/L — SIGNIFICANT CHANGE UP (ref 135–146)
VIT B12 SERPL-MCNC: 713 PG/ML — SIGNIFICANT CHANGE UP (ref 232–1245)
WBC # BLD: 6.47 K/UL — SIGNIFICANT CHANGE UP (ref 4.8–10.8)
WBC # FLD AUTO: 6.47 K/UL — SIGNIFICANT CHANGE UP (ref 4.8–10.8)

## 2020-01-29 PROCEDURE — 99232 SBSQ HOSP IP/OBS MODERATE 35: CPT

## 2020-01-29 PROCEDURE — 93880 EXTRACRANIAL BILAT STUDY: CPT | Mod: 26

## 2020-01-29 PROCEDURE — 99239 HOSP IP/OBS DSCHRG MGMT >30: CPT

## 2020-01-29 RX ADMIN — Medication 25 MILLIGRAM(S): at 06:33

## 2020-01-29 RX ADMIN — Medication 81 MILLIGRAM(S): at 12:09

## 2020-01-29 RX ADMIN — LISINOPRIL 5 MILLIGRAM(S): 2.5 TABLET ORAL at 06:33

## 2020-01-29 NOTE — PROGRESS NOTE ADULT - ASSESSMENT
74 years old male (right handed) with past medical h/o HTN, DLD and BPH p.w  numbness over the left side of face that radiated to ear, Left arm and left leg.  Initial CT head was consistent with chronic microvascular changes. CTA head & Neck was unremarkable. No acute overnight events. MRI Brain negative for acute CVA.     Plan  Follow up pending echo   PT OT Rehab  Continue ASA and statin. 74 years old male (right handed) with past medical h/o HTN, DLD and BPH p.w  numbness over the left side of face that radiated to ear, Left arm and left leg.  Initial CT head was consistent with chronic microvascular changes. CTA head & Neck was unremarkable. No acute overnight events. MRI Brain negative for acute CVA.     Plan  Follow up pending echo   PT OT Rehab  Continue ASA and statin at LDL appropriate dosing.  Follow up for aneurysm with Dr. Morataya in neurovascular clinic in 3-4 weeks  Will likely need cervical spine imaging for etiology of current symptoms but can be done as an out patient  Follow up with General neurology as out patient

## 2020-01-29 NOTE — PROGRESS NOTE ADULT - SUBJECTIVE AND OBJECTIVE BOX
Neurology Follow up note  Patient seen and examined at bedside used Mandarin  ID [id number] used to examine and interview patient. He states that his left sided sensory symptom is intermittent in nature and left UE weakness noted to be improved. No acute overnight events.         Vital Signs Last 24 Hrs  T(C): 36.8 (28 Jan 2020 23:20), Max: 36.8 (28 Jan 2020 07:40)  T(F): 98.2 (28 Jan 2020 23:20), Max: 98.2 (28 Jan 2020 07:40)  HR: 61 (28 Jan 2020 23:20) (60 - 68)  BP: 136/58 (28 Jan 2020 23:20) (121/56 - 144/72)  BP(mean): --  RR: 18 (28 Jan 2020 23:20) (18 - 18)  SpO2: 97% (28 Jan 2020 23:20) (97% - 98%)          Neurological Exam:   Mental status: Awake, alert and oriented x3.  Naming, repetition and comprehension intact.  Attention/concentration intact.  No dysarthria, no aphasia.  Fund of knowledge appropriate.    Cranial nerves: pupils equally round and reactive to light, visual fields full, no nystagmus, extraocular muscles intact, V1 through V3 intact bilaterally and symmetric, face symmetric, hearing intact to finger rub, palate elevation symmetric, tongue was midline, sternocleidomastoid/shoulder shrug strength bilaterally 5/5.    Motor:  5/5 throughout/ no drift  Sensation: Intact and symmetric   Coordination: No dysmetria on finger-to-nose and heel-to-shin.  No clumsiness.  Gait: deferred    NIHSS 0  mRs  0      Medications  aspirin  chewable 81 milliGRAM(s) Oral daily  atorvastatin 80 milliGRAM(s) Oral at bedtime  chlorhexidine 4% Liquid 1 Application(s) Topical two times a day  enoxaparin Injectable 40 milliGRAM(s) SubCutaneous at bedtime  lisinopril 5 milliGRAM(s) Oral daily  metoprolol tartrate 25 milliGRAM(s) Oral two times a day      Lab  Lipid Profile in AM (01.28.20 @ 06:16)    Total Cholesterol/HDL Ratio Measurement: 5.8 Ratio    Cholesterol, Serum: 203 mg/dL    Triglycerides, Serum: 193 mg/dL    HDL Cholesterol, Serum: 35: HDL Levels >/= 60 mg/dL are considered beneficial and a "negative" risk  factor.  Effective 08/15/2018: New reference range and interpretive comment. mg/dL    Direct LDL: 142: LDL Cholesterol (mg/dL) --- Interpretive Comment (for adults 18 and over)    Hemoglobin A1C with Mean Plasma Glucose (01.28.20 @ 06:16)    Hemoglobin A1C, Whole Blood: 7.0: Method: Immunoassay        Mean Plasma Glucose: 154 mg/dL            Radiology  < from: MR Head No Cont (01.28.20 @ 14:55) >  IMPRESSION:    In comparison with the prior MRI of the brain dated May 9, 2016:    Slight increase in the periventricular and subcortical white matter hyperintensities consistent with chronic ischemic change.    No MRI evidence to suggest acute ischemic change.    Abnormal signal in the anterior right mastoid air cells consistent with inflammation or infection.    Again demonstrated is a lesion arising in the right parotid gland.

## 2020-01-29 NOTE — DISCHARGE NOTE PROVIDER - NSDCCPCAREPLAN_GEN_ALL_CORE_FT
PRINCIPAL DISCHARGE DIAGNOSIS  Diagnosis: Facial numbness  Assessment and Plan of Treatment: You had MRI which showed no evidence of stroke. Please continue aspirin and cholesterol medication. Please also follow up with your primary care doctor within 1-2 weeks of discharge. Our suspicioun is that your symptoms have been caused by pathology in the neck. You may need outpatient imaging of your neck to test for pain from a pinched nerve. Additionally you will need to have repeat testing for hepatitis C as your in patient testing has showed weakly reactive. PRINCIPAL DISCHARGE DIAGNOSIS  Diagnosis: Facial numbness  Assessment and Plan of Treatment: You had MRI which showed no evidence of stroke. Please continue aspirin and cholesterol medication. Please also follow up with your primary care doctor within 1-2 weeks of discharge. Our suspicioun is that your symptoms have been caused by pathology in the neck. You may need outpatient imaging of your neck to test for pain from a pinched nerve. Additionally you will need to have repeat testing for hepatitis C as your in patient testing has showed weakly reactive.      SECONDARY DISCHARGE DIAGNOSES  Diagnosis: Lesion of parotid gland  Assessment and Plan of Treatment: During this admission we found a lesion in your parotid gland. You will need to follow up with your primary care doctor for this lesion. It will need to be sampled with a needle. Please follow up within 1-2 weeks of discharge from the hospital.

## 2020-01-29 NOTE — PROGRESS NOTE ADULT - SUBJECTIVE AND OBJECTIVE BOX
HPI:  74 years old male (right handed) with past medical h/o HTN, DLD and BPH presented to the ED for the complain of numbness over the left side of face that radiated to ear, Left arm and left leg. This numbness started for the first time 3 weeks ago and lasted for about an hour and went away on its own. Patient started taking aspirin after that on advice of family doctor and says that he was at his baseline since then. Yesterday he again experienced identical numbness of left side of body that again lasted for an hour. Patient denies any tingling, weakness or trouble using left hand for holding objects.  Patient reports that he was taking aspirin previously for 10-13 years. he visited his neurologist ( Dr. Frederic segura) for memory problems about 3 years ago. he did some imaging that showed "aneurysm" and told patient to follow up every year and take aspirin. but patient had stop taking aspirin since last 3 years because he had an ulcer/ bleed ? for which he had to undergo endoscopy at that time. According to patient everything was fine on EGD.    In ED CT head was performed that was consistent with chronic microvascular changes. CTA head & Neck was unremarkable for any vascular malformation. Vitals were normal except / 70mmHg which according to patient is higher than his baseline ( 120/80mmHg).    Patient denies any headache, dizziness, vision problems, chest pain, palpitations, SOB, cough, fever, chills, constipation, diarrhea, abdominal pain, , dysuria.     pt is feeling better and does not have any more numbness . no fever, no chills.     Vital Signs Last 24 Hrs  T(C): 36.6 (29 Jan 2020 07:44), Max: 36.8 (28 Jan 2020 23:20)  T(F): 97.9 (29 Jan 2020 07:44), Max: 98.2 (28 Jan 2020 23:20)  HR: 60 (29 Jan 2020 07:44) (60 - 64)  BP: 112/56 (29 Jan 2020 07:44) (112/56 - 144/72)  RR: 18 (29 Jan 2020 07:44) (18 - 18)  SpO2: 96% (29 Jan 2020 07:44) (96% - 98%)    Physical exam:   constitutional NAD, AAOX3, Respiratory  lungs CTA, CVS heart RRR, GI: abdomen Soft NT, ND, BS+, skin: intact  neuro exam non focal.                           15.4   6.47  )-----------( 247      ( 29 Jan 2020 06:41 )             45.3   01-29    142  |  103  |  17  ----------------------------<  108<H>  4.3   |  27  |  0.8    Ca    10.4<H>      29 Jan 2020 06:41  Mg     1.9     01-29    TPro  6.6  /  Alb  3.8  /  TBili  0.4  /  DBili  x   /  AST  18  /  ALT  16  /  AlkPhos  62  01-28    < from: CT Angio Neck w/ IV Cont (01.27.20 @ 22:05) >  1.  No large vessel occlusion.  2.  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  3.  Lesion arising in the superficial portion of the right parotid gland (benign versus malignant).  4.  Opacity in the right upper lobe, incompletely evaluated, correlation with a CT scan of the chest is suggested.  < end of copied text >    < from: MR Head No Cont (01.28.20 @ 14:55) >  Slight increase in the periventricular and subcortical white matter hyperintensities consistent with chronic ischemic change.  No MRI evidence to suggest acute ischemic change.  Abnormal signal in the anterior right mastoid air cells consistent with inflammation or infection.  Again demonstrated is a lesion arising in the right parotid gland.  < end of copied text >    < from: CT Chest No Cont (01.28.20 @ 12:04) >  Opacity within the right upper lobe corresponds to an area of linear scarring/atelectasis. No suspicious mass within the right upper lobe as seen.     Nonspecific bilateral lower lobe nodular opacities measuring up to 8 mm which may represent foci mucoid impaction. Noncontrast CT chest is recommended as follow-up in 3-6 months to demonstrate resolution.    < end of copied text >      A/P  TIA, cont asa, aneurysm, : follow up with neurology   parotid lesion, outpt fu   mastoid inflammation, outpt ENT eval   RUL opacity, ct chest shows scarring, repeat ct in 3-6 months for evaluation of nodules in bilat lower lobes.   Dyslipidemia  BPH      poss dc today if ok with neurology   pending carotid doppler HPI:  74 years old male (right handed) with past medical h/o HTN, DLD and BPH presented to the ED for the complain of numbness over the left side of face that radiated to ear, Left arm and left leg. This numbness started for the first time 3 weeks ago and lasted for about an hour and went away on its own. Patient started taking aspirin after that on advice of family doctor and says that he was at his baseline since then. Yesterday he again experienced identical numbness of left side of body that again lasted for an hour. Patient denies any tingling, weakness or trouble using left hand for holding objects.  Patient reports that he was taking aspirin previously for 10-13 years. he visited his neurologist ( Dr. Frederic segura) for memory problems about 3 years ago. he did some imaging that showed "aneurysm" and told patient to follow up every year and take aspirin. but patient had stop taking aspirin since last 3 years because he had an ulcer/ bleed ? for which he had to undergo endoscopy at that time. According to patient everything was fine on EGD.    In ED CT head was performed that was consistent with chronic microvascular changes. CTA head & Neck was unremarkable for any vascular malformation. Vitals were normal except / 70mmHg which according to patient is higher than his baseline ( 120/80mmHg).    Patient denies any headache, dizziness, vision problems, chest pain, palpitations, SOB, cough, fever, chills, constipation, diarrhea, abdominal pain, , dysuria.     pt is feeling better and does not have any more numbness . no fever, no chills.     Vital Signs Last 24 Hrs  T(C): 36.6 (29 Jan 2020 07:44), Max: 36.8 (28 Jan 2020 23:20)  T(F): 97.9 (29 Jan 2020 07:44), Max: 98.2 (28 Jan 2020 23:20)  HR: 60 (29 Jan 2020 07:44) (60 - 64)  BP: 112/56 (29 Jan 2020 07:44) (112/56 - 144/72)  RR: 18 (29 Jan 2020 07:44) (18 - 18)  SpO2: 96% (29 Jan 2020 07:44) (96% - 98%)    Physical exam:   constitutional NAD, AAOX3, Respiratory  lungs CTA, CVS heart RRR, GI: abdomen Soft NT, ND, BS+, skin: intact  neuro exam non focal.                           15.4   6.47  )-----------( 247      ( 29 Jan 2020 06:41 )             45.3   01-29    142  |  103  |  17  ----------------------------<  108<H>  4.3   |  27  |  0.8    Ca    10.4<H>      29 Jan 2020 06:41  Mg     1.9     01-29    TPro  6.6  /  Alb  3.8  /  TBili  0.4  /  DBili  x   /  AST  18  /  ALT  16  /  AlkPhos  62  01-28    < from: CT Angio Neck w/ IV Cont (01.27.20 @ 22:05) >  1.  No large vessel occlusion.  2.  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  3.  Lesion arising in the superficial portion of the right parotid gland (benign versus malignant).  4.  Opacity in the right upper lobe, incompletely evaluated, correlation with a CT scan of the chest is suggested.  < end of copied text >    < from: MR Head No Cont (01.28.20 @ 14:55) >  Slight increase in the periventricular and subcortical white matter hyperintensities consistent with chronic ischemic change.  No MRI evidence to suggest acute ischemic change.  Abnormal signal in the anterior right mastoid air cells consistent with inflammation or infection.  Again demonstrated is a lesion arising in the right parotid gland.  < end of copied text >    < from: CT Chest No Cont (01.28.20 @ 12:04) >  Opacity within the right upper lobe corresponds to an area of linear scarring/atelectasis. No suspicious mass within the right upper lobe as seen.     Nonspecific bilateral lower lobe nodular opacities measuring up to 8 mm which may represent foci mucoid impaction. Noncontrast CT chest is recommended as follow-up in 3-6 months to demonstrate resolution.    < end of copied text >    < from: Transthoracic Echocardiogram (01.28.20 @ 13:38) >   1. Left ventricular ejection fraction, by visual estimation, is 60 to 65%.   2. Normal global left ventricular systolic function.   3. Mild concentric left ventricular hypertrophy.   4. Mildly increased LV wall thickness.   5. Normal left ventricular internal cavity size.   6. Trivial pericardial effusion.  < end of copied text >    < from: VA Duplex Carotid, Bilat (01.29.20 @ 11:08) >  Less than 50% stenosis of the right internal carotid artery.  Less than 50% stenosis of the left internal carotid artery.    < end of copied text >      A/P  TIA, cont asa, aneurysm, : follow up with neurology   parotid lesion, outpt fu   mastoid inflammation, outpt ENT eval   RUL opacity, ct chest shows scarring, repeat ct in 3-6 months for evaluation of nodules in bilat lower lobes.   Dyslipidemia  BPH      poss dc today if ok with neurology   pending carotid doppler HPI:  74 years old male (right handed) with past medical h/o HTN, DLD and BPH presented to the ED for the complain of numbness over the left side of face that radiated to ear, Left arm and left leg. This numbness started for the first time 3 weeks ago and lasted for about an hour and went away on its own. Patient started taking aspirin after that on advice of family doctor and says that he was at his baseline since then. Yesterday he again experienced identical numbness of left side of body that again lasted for an hour. Patient denies any tingling, weakness or trouble using left hand for holding objects.  Patient reports that he was taking aspirin previously for 10-13 years. he visited his neurologist ( Dr. Frederic segura) for memory problems about 3 years ago. he did some imaging that showed "aneurysm" and told patient to follow up every year and take aspirin. but patient had stop taking aspirin since last 3 years because he had an ulcer/ bleed ? for which he had to undergo endoscopy at that time. According to patient everything was fine on EGD.    In ED CT head was performed that was consistent with chronic microvascular changes. CTA head & Neck was unremarkable for any vascular malformation. Vitals were normal except / 70mmHg which according to patient is higher than his baseline ( 120/80mmHg).    Patient denies any headache, dizziness, vision problems, chest pain, palpitations, SOB, cough, fever, chills, constipation, diarrhea, abdominal pain, , dysuria.     pt is feeling better and does not have any more numbness . no fever, no chills.     Vital Signs Last 24 Hrs  T(C): 36.6 (29 Jan 2020 07:44), Max: 36.8 (28 Jan 2020 23:20)  T(F): 97.9 (29 Jan 2020 07:44), Max: 98.2 (28 Jan 2020 23:20)  HR: 60 (29 Jan 2020 07:44) (60 - 64)  BP: 112/56 (29 Jan 2020 07:44) (112/56 - 144/72)  RR: 18 (29 Jan 2020 07:44) (18 - 18)  SpO2: 96% (29 Jan 2020 07:44) (96% - 98%)    Physical exam:   constitutional NAD, AAOX3, Respiratory  lungs CTA, CVS heart RRR, GI: abdomen Soft NT, ND, BS+, skin: intact  neuro exam non focal.                           15.4   6.47  )-----------( 247      ( 29 Jan 2020 06:41 )             45.3   01-29    142  |  103  |  17  ----------------------------<  108<H>  4.3   |  27  |  0.8    Ca    10.4<H>      29 Jan 2020 06:41  Mg     1.9     01-29    TPro  6.6  /  Alb  3.8  /  TBili  0.4  /  DBili  x   /  AST  18  /  ALT  16  /  AlkPhos  62  01-28    < from: CT Angio Neck w/ IV Cont (01.27.20 @ 22:05) >  1.  No large vessel occlusion.  2.  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  3.  Lesion arising in the superficial portion of the right parotid gland (benign versus malignant).  4.  Opacity in the right upper lobe, incompletely evaluated, correlation with a CT scan of the chest is suggested.  < end of copied text >    < from: MR Head No Cont (01.28.20 @ 14:55) >  Slight increase in the periventricular and subcortical white matter hyperintensities consistent with chronic ischemic change.  No MRI evidence to suggest acute ischemic change.  Abnormal signal in the anterior right mastoid air cells consistent with inflammation or infection.  Again demonstrated is a lesion arising in the right parotid gland.  < end of copied text >    < from: CT Chest No Cont (01.28.20 @ 12:04) >  Opacity within the right upper lobe corresponds to an area of linear scarring/atelectasis. No suspicious mass within the right upper lobe as seen.     Nonspecific bilateral lower lobe nodular opacities measuring up to 8 mm which may represent foci mucoid impaction. Noncontrast CT chest is recommended as follow-up in 3-6 months to demonstrate resolution.    < end of copied text >    < from: Transthoracic Echocardiogram (01.28.20 @ 13:38) >   1. Left ventricular ejection fraction, by visual estimation, is 60 to 65%.   2. Normal global left ventricular systolic function.   3. Mild concentric left ventricular hypertrophy.   4. Mildly increased LV wall thickness.   5. Normal left ventricular internal cavity size.   6. Trivial pericardial effusion.  < end of copied text >    < from: VA Duplex Carotid, Bilat (01.29.20 @ 11:08) >  Less than 50% stenosis of the right internal carotid artery.  Less than 50% stenosis of the left internal carotid artery.    < end of copied text >      A/P  TIA, cont asa, aneurysm, : follow up with neurology , carotid aneurysm, is stable follow up as outpt   parotid lesion, outpt fu   mastoid inflammation, outpt ENT eval   RUL opacity, ct chest shows scarring, repeat ct in 3-6 months for evaluation of nodules in bilat lower lobes.   Dyslipidemia  BPH      poss dc today if ok with neurology   pending carotid doppler

## 2020-01-29 NOTE — PROGRESS NOTE ADULT - ATTENDING COMMENTS
Patient seen and examined and agree with above except as noted.  Reviewed notes, imaging, labs and vitals personally  MRI brain prelim negative  Unlikely TIA stroke  Possibly cervical pathology    Plan as above

## 2020-01-29 NOTE — PHYSICAL THERAPY INITIAL EVALUATION ADULT - GENERAL OBSERVATIONS, REHAB EVAL
14:31 Pt encountered seated at edge of stretcher in ER in NAD, son present. Pt denies  any numbness or tingling at this time, reports feels baseline.

## 2020-01-29 NOTE — DISCHARGE NOTE PROVIDER - NSDCMRMEDTOKEN_GEN_ALL_CORE_FT
aspirin 81 mg oral tablet, chewable: 1 tab(s) orally once a day  atorvastatin 20 mg oral tablet: 1 tab(s) orally once a day  gemfibrozil 600 mg oral tablet: 1 tab(s) orally once a day  lisinopril 5 mg oral tablet: 1 tab(s) orally once a day  Metoprolol Tartrate 25 mg oral tablet: 1 tab(s) orally 2 times a day

## 2020-01-29 NOTE — DISCHARGE NOTE NURSING/CASE MANAGEMENT/SOCIAL WORK - PATIENT PORTAL LINK FT
You can access the FollowMyHealth Patient Portal offered by [facility] by registering at the following website: [email or web address]. By joining [employer]’s FollowMyHealth portal, you will also be able to view your health information using other applications (apps) compatible with our system.

## 2020-01-29 NOTE — DISCHARGE NOTE PROVIDER - HOSPITAL COURSE
74 years old male (right handed) with past medical h/o HTN, DLD and BPH presented to the ED for the complain of numbness over the left side of face that radiated to ear, Left arm and left leg. Patient was admitted for stroke vs. TIA workup. MRI was performed which was negative and carotid duplex was negative as well. Patient to be discharged to follow up with primary care doctor within 1-2 weeks. Additionally patient was intermediate for Hepatitis C, will need repeat testing as outpatient.

## 2020-02-03 DIAGNOSIS — R20.0 ANESTHESIA OF SKIN: ICD-10-CM

## 2020-02-03 DIAGNOSIS — K11.9 DISEASE OF SALIVARY GLAND, UNSPECIFIED: ICD-10-CM

## 2020-02-03 DIAGNOSIS — E78.5 HYPERLIPIDEMIA, UNSPECIFIED: ICD-10-CM

## 2020-02-03 DIAGNOSIS — M53.82 OTHER SPECIFIED DORSOPATHIES, CERVICAL REGION: ICD-10-CM

## 2020-02-03 DIAGNOSIS — I10 ESSENTIAL (PRIMARY) HYPERTENSION: ICD-10-CM

## 2020-02-03 DIAGNOSIS — N40.0 BENIGN PROSTATIC HYPERPLASIA WITHOUT LOWER URINARY TRACT SYMPTOMS: ICD-10-CM

## 2020-02-03 DIAGNOSIS — H53.2 DIPLOPIA: ICD-10-CM

## 2020-02-03 DIAGNOSIS — H70.90 UNSPECIFIED MASTOIDITIS, UNSPECIFIED EAR: ICD-10-CM

## 2020-02-03 DIAGNOSIS — K75.2 NONSPECIFIC REACTIVE HEPATITIS: ICD-10-CM

## 2020-02-03 DIAGNOSIS — Z79.82 LONG TERM (CURRENT) USE OF ASPIRIN: ICD-10-CM

## 2020-02-03 DIAGNOSIS — H93.12 TINNITUS, LEFT EAR: ICD-10-CM

## 2020-02-03 DIAGNOSIS — Z86.73 PERSONAL HISTORY OF TRANSIENT ISCHEMIC ATTACK (TIA), AND CEREBRAL INFARCTION WITHOUT RESIDUAL DEFICITS: ICD-10-CM

## 2021-12-29 NOTE — PROGRESS NOTE ADULT - REASON FOR ADMISSION
Addended by: ÓSCAR HEADLEY on: 12/29/2021 03:36 PM     Modules accepted: Orders    
Numbness of left side of face, arm , leg

## 2023-01-30 PROBLEM — N40.0 BENIGN PROSTATIC HYPERPLASIA WITHOUT LOWER URINARY TRACT SYMPTOMS: Chronic | Status: ACTIVE | Noted: 2020-01-28

## 2023-01-30 PROBLEM — E78.5 HYPERLIPIDEMIA, UNSPECIFIED: Chronic | Status: ACTIVE | Noted: 2020-01-28

## 2023-01-30 PROBLEM — I10 ESSENTIAL (PRIMARY) HYPERTENSION: Chronic | Status: ACTIVE | Noted: 2020-01-28

## 2023-02-06 ENCOUNTER — OUTPATIENT (OUTPATIENT)
Dept: OUTPATIENT SERVICES | Facility: HOSPITAL | Age: 78
LOS: 1 days | End: 2023-02-06
Payer: MEDICARE

## 2023-02-06 DIAGNOSIS — Z00.8 ENCOUNTER FOR OTHER GENERAL EXAMINATION: ICD-10-CM

## 2023-02-06 DIAGNOSIS — I67.1 CEREBRAL ANEURYSM, NONRUPTURED: ICD-10-CM

## 2023-02-06 PROCEDURE — 70551 MRI BRAIN STEM W/O DYE: CPT | Mod: 26,MH

## 2023-02-06 PROCEDURE — 70544 MR ANGIOGRAPHY HEAD W/O DYE: CPT | Mod: 26,MH,59

## 2023-02-06 PROCEDURE — 70544 MR ANGIOGRAPHY HEAD W/O DYE: CPT | Mod: MH

## 2023-02-06 PROCEDURE — 70551 MRI BRAIN STEM W/O DYE: CPT | Mod: MH

## 2023-02-07 DIAGNOSIS — I67.1 CEREBRAL ANEURYSM, NONRUPTURED: ICD-10-CM

## 2023-10-31 ENCOUNTER — OUTPATIENT (OUTPATIENT)
Dept: OUTPATIENT SERVICES | Facility: HOSPITAL | Age: 78
LOS: 1 days | End: 2023-10-31
Payer: MEDICARE

## 2023-10-31 DIAGNOSIS — Z00.8 ENCOUNTER FOR OTHER GENERAL EXAMINATION: ICD-10-CM

## 2023-10-31 DIAGNOSIS — R10.9 UNSPECIFIED ABDOMINAL PAIN: ICD-10-CM

## 2023-10-31 PROCEDURE — 71046 X-RAY EXAM CHEST 2 VIEWS: CPT

## 2023-10-31 PROCEDURE — 76700 US EXAM ABDOM COMPLETE: CPT

## 2023-10-31 PROCEDURE — 76700 US EXAM ABDOM COMPLETE: CPT | Mod: 26

## 2023-10-31 PROCEDURE — 71046 X-RAY EXAM CHEST 2 VIEWS: CPT | Mod: 26

## 2023-11-01 DIAGNOSIS — R10.9 UNSPECIFIED ABDOMINAL PAIN: ICD-10-CM
